# Patient Record
Sex: MALE | Race: WHITE | NOT HISPANIC OR LATINO
[De-identification: names, ages, dates, MRNs, and addresses within clinical notes are randomized per-mention and may not be internally consistent; named-entity substitution may affect disease eponyms.]

---

## 2020-02-28 ENCOUNTER — APPOINTMENT (OUTPATIENT)
Dept: PEDIATRICS | Facility: CLINIC | Age: 20
End: 2020-02-28

## 2020-03-02 ENCOUNTER — RESULT CHARGE (OUTPATIENT)
Age: 20
End: 2020-03-02

## 2020-03-02 ENCOUNTER — APPOINTMENT (OUTPATIENT)
Dept: PEDIATRICS | Facility: CLINIC | Age: 20
End: 2020-03-02
Payer: COMMERCIAL

## 2020-03-02 VITALS
SYSTOLIC BLOOD PRESSURE: 136 MMHG | BODY MASS INDEX: 25.8 KG/M2 | TEMPERATURE: 97.7 F | HEIGHT: 69.25 IN | HEART RATE: 57 BPM | DIASTOLIC BLOOD PRESSURE: 74 MMHG | WEIGHT: 176.19 LBS

## 2020-03-02 DIAGNOSIS — Z00.00 ENCOUNTER FOR GENERAL ADULT MEDICAL EXAMINATION W/OUT ABNORMAL FINDINGS: ICD-10-CM

## 2020-03-02 LAB
BILIRUB UR QL STRIP: NEGATIVE
CLARITY UR: CLEAR
COLLECTION METHOD: NORMAL
GLUCOSE UR-MCNC: NEGATIVE
HCG UR QL: 0.2 EU/DL
HGB UR QL STRIP.AUTO: NEGATIVE
KETONES UR-MCNC: NEGATIVE
LEUKOCYTE ESTERASE UR QL STRIP: NEGATIVE
NITRITE UR QL STRIP: NEGATIVE
PH UR STRIP: 5.5
PROT UR STRIP-MCNC: NEGATIVE
SP GR UR STRIP: >=1.03

## 2020-03-02 PROCEDURE — 90621 MENB-FHBP VACC 2/3 DOSE IM: CPT

## 2020-03-02 PROCEDURE — 90472 IMMUNIZATION ADMIN EACH ADD: CPT

## 2020-03-02 PROCEDURE — 99395 PREV VISIT EST AGE 18-39: CPT | Mod: 25

## 2020-03-02 PROCEDURE — 99173 VISUAL ACUITY SCREEN: CPT | Mod: 59

## 2020-03-02 PROCEDURE — 96127 BRIEF EMOTIONAL/BEHAV ASSMT: CPT

## 2020-03-02 PROCEDURE — 90715 TDAP VACCINE 7 YRS/> IM: CPT

## 2020-03-02 PROCEDURE — 96160 PT-FOCUSED HLTH RISK ASSMT: CPT | Mod: 59

## 2020-03-02 PROCEDURE — 90471 IMMUNIZATION ADMIN: CPT

## 2020-03-02 RX ORDER — CLOBETASOL PROPIONATE 0.5 MG/G
0.05 CREAM TOPICAL
Qty: 15 | Refills: 0 | Status: COMPLETED | COMMUNITY
Start: 2019-08-21

## 2020-03-02 RX ORDER — TRIAMCINOLONE ACETONIDE 1 MG/G
0.1 CREAM TOPICAL
Qty: 15 | Refills: 0 | Status: COMPLETED | COMMUNITY
Start: 2019-09-27

## 2020-03-02 NOTE — PHYSICAL EXAM
[Alert] : alert [No Acute Distress] : no acute distress [EOMI Bilateral] : EOMI bilateral [Normocephalic] : normocephalic [Pink Nasal Mucosa] : pink nasal mucosa [Clear tympanic membranes with bony landmarks and light reflex present bilaterally] : clear tympanic membranes with bony landmarks and light reflex present bilaterally  [Nonerythematous Oropharynx] : nonerythematous oropharynx [Supple, full passive range of motion] : supple, full passive range of motion [No Palpable Masses] : no palpable masses [Clear to Auscultation Bilaterally] : clear to auscultation bilaterally [Regular Rate and Rhythm] : regular rate and rhythm [Normal S1, S2 audible] : normal S1, S2 audible [No Murmurs] : no murmurs [+2 Femoral Pulses] : +2 femoral pulses [Soft] : soft [Non Distended] : non distended [NonTender] : non tender [Normoactive Bowel Sounds] : normoactive bowel sounds [No Hepatomegaly] : no hepatomegaly [No Splenomegaly] : no splenomegaly [No Abnormal Lymph Nodes Palpated] : no abnormal lymph nodes palpated [Normal Muscle Tone] : normal muscle tone [No Gait Asymmetry] : no gait asymmetry [+2 Patella DTR] : +2 patella DTR [No pain or deformities with palpation of bone, muscles, joints] : no pain or deformities with palpation of bone, muscles, joints [Straight] : straight [Cranial Nerves Grossly Intact] : cranial nerves grossly intact [de-identified] : nevus on face

## 2020-03-02 NOTE — DISCUSSION/SUMMARY
[] : The components of the vaccine(s) to be administered today are listed in the plan of care. The disease(s) for which the vaccine(s) are intended to prevent and the risks have been discussed with the caretaker.  The risks are also included in the appropriate vaccination information statements which have been provided to the patient's caregiver.  The caregiver has given consent to vaccinate. [FreeTextEntry1] : Healthy 17 year old/young adult\par Discussed safety/anticipatory guidance\par Discussed avoiding risk taking behavior\par Discussed healthy lifestyle habits\par Reviewed immunization forecast and discussed need for any vaccines, reviewed side effects and VIS\par Discussed need for routine health maintenance and establishing relationship with adult provider\par Discussed need for routine DANIELLE and gave appropriate handout\par Follow-up: 1 year with adult provider\par \par Discussed and/or provided information on the following:\par PHYSICAL GROWTH AND DEVELOPMENT: Physical and oral health, body image, healthy eating, physical activity\par SOCIAL AND ACADEMIC COMPENTENCE: Connectedness with family, peers, and community; interpersonal relationships; school performance\par EMOTIONAL WELL-BEING: Coping, mood regulation and mental health (depression), sexuality\par RISK REDUCTION: Tobacco, alcohol, or other drugs; pregnancy; STIs, Advice about unprotected sex/birth control\par VIOLENCE AND INJURY PREVENTION: Safety belt and helmet use, driving (graduated license) and substance abuse, guns, interpersonal violence (dating violence), bullying\par PHYSICAL ACTIVITY: Adequate physical activity in organized sports, after-school programs, fun activities; limits on screen time\par refer to derm\par refer to \par refused EKG NOW ,WILL COME BACK IF HAPPENS AGAIN

## 2020-03-02 NOTE — HISTORY OF PRESENT ILLNESS
[Mother] : mother [Up to date] : Up to date [Has family members/adults to turn to for help] : has family members/adults to turn to for help [Eats meals with family] : eats meals with family [Is permitted and is able to make independent decisions] : Is permitted and is able to make independent decisions [Grade: ____] : Grade: [unfilled] [Sleep Concerns] : no sleep concerns [Normal Behavior/Attention] : normal behavior/attention [Normal Performance] : normal performance [Normal Homework] : normal homework [Eats regular meals including adequate fruits and vegetables] : eats regular meals including adequate fruits and vegetables [Drinks non-sweetened liquids] : drinks non-sweetened liquids  [Calcium source] : calcium source [Has concerns about body or appearance] : does not have concerns about body or appearance [Has friends] : has friends [At least 1 hour of physical activity a day] : at least 1 hour of physical activity a day [Screen time (except homework) less than 2 hours a day] : no screen time (except homework) less than 2 hours a day [Has interests/participates in community activities/volunteers] : does not have interests/participates in community activities/volunteers [Uses electronic nicotine delivery system] : uses electronic nicotine delivery system [Exposure to electronic nicotine delivery system] : exposure to electronic nicotine delivery system [Exposure to tobacco] : no exposure to tobacco [Uses tobacco] : does not use tobacco [Uses drugs] : does not use drugs  [Exposure to drugs] : no exposure to drugs [Exposure to alcohol] : exposure to alcohol [Impaired/distracted driving] : impaired/distracted driving [Uses safety belts/safety equipment] : uses safety belts/safety equipment  [Has peer relationships free of violence] : has peer relationships free of violence [Yes] : Patient has had sexual intercourse. [HIV Screening Declined] : HIV Screening Declined [Has ways to cope with stress] : has ways to cope with stress [Displays self-confidence] : displays self-confidence [Has problems with sleep] : has problems with sleep [Gets depressed, anxious, or irritable/has mood swings] : does not get depressed, anxious, or irritable/has mood swings [Has thought about hurting self or considered suicide] : has not thought about hurting self or considered suicide [With Teen] : teen [de-identified] : feels worried about family issues but doesn’t want to talk about it [de-identified] : counselled about not texting while driving [FreeTextEntry1] : 19 years old well visit